# Patient Record
Sex: MALE | Race: WHITE | URBAN - NONMETROPOLITAN AREA
[De-identification: names, ages, dates, MRNs, and addresses within clinical notes are randomized per-mention and may not be internally consistent; named-entity substitution may affect disease eponyms.]

---

## 2019-10-02 ENCOUNTER — NEW PATIENT (OUTPATIENT)
Dept: URBAN - NONMETROPOLITAN AREA CLINIC 6 | Facility: CLINIC | Age: 84
End: 2019-10-02
Payer: COMMERCIAL

## 2019-10-02 DIAGNOSIS — H35.3131 NONEXUDATIVE MACULAR DEGENERATION, EARLY DRY STAGE, BILATERAL: ICD-10-CM

## 2019-10-02 DIAGNOSIS — Z96.1 PRESENCE OF INTRAOCULAR LENS: ICD-10-CM

## 2019-10-02 DIAGNOSIS — H26.493 OTHER SECONDARY CATARACT, BILATERAL: Primary | ICD-10-CM

## 2019-10-02 DIAGNOSIS — H16.143 PUNCTATE KERATITIS, BILATERAL: ICD-10-CM

## 2019-10-02 PROCEDURE — 92004 COMPRE OPH EXAM NEW PT 1/>: CPT | Performed by: OPTOMETRIST

## 2019-10-02 ASSESSMENT — VISUAL ACUITY
OD: 20/40
OS: 20/40

## 2019-10-02 ASSESSMENT — INTRAOCULAR PRESSURE
OD: 13
OS: 13

## 2022-11-09 ENCOUNTER — OFFICE VISIT (OUTPATIENT)
Dept: URBAN - NONMETROPOLITAN AREA CLINIC 6 | Facility: CLINIC | Age: 87
End: 2022-11-09
Payer: COMMERCIAL

## 2022-11-09 DIAGNOSIS — H35.3133 BILATERAL NONEXUDATIVE AGE-RELATED MACULAR DEGENERATION, ADVANCED ATROPHIC W/O SUBFOVEAL INVOLVEMENT: Primary | ICD-10-CM

## 2022-11-09 PROCEDURE — 92004 COMPRE OPH EXAM NEW PT 1/>: CPT | Performed by: OPTOMETRIST

## 2022-11-09 ASSESSMENT — INTRAOCULAR PRESSURE
OS: 10
OD: 13

## 2022-11-09 ASSESSMENT — VISUAL ACUITY
OS: LP
OD: 20/400

## 2022-11-09 NOTE — IMPRESSION/PLAN
Impression: Bilateral nonexudative age-related macular degeneration, advanced atrophic w/o subfoveal involvement: H35.3133. Plan: Macular degeneration, dry type, appears progressed with decreased vision. Patient educated regarding findings. Recommend patient take an [employer]'s formula multiple vitamin daily. Observation is all that is indicated at this time.